# Patient Record
Sex: FEMALE | Race: OTHER | Employment: FULL TIME | URBAN - METROPOLITAN AREA
[De-identification: names, ages, dates, MRNs, and addresses within clinical notes are randomized per-mention and may not be internally consistent; named-entity substitution may affect disease eponyms.]

---

## 2017-02-22 ENCOUNTER — HOSPITAL ENCOUNTER (OUTPATIENT)
Age: 42
Discharge: HOME | End: 2017-02-22
Attending: EMERGENCY MEDICINE
Payer: COMMERCIAL

## 2017-02-22 ENCOUNTER — APPOINTMENT (OUTPATIENT)
Dept: GENERAL RADIOLOGY | Age: 42
End: 2017-02-22
Attending: EMERGENCY MEDICINE
Payer: COMMERCIAL

## 2017-02-22 VITALS
HEIGHT: 63 IN | BODY MASS INDEX: 24.8 KG/M2 | OXYGEN SATURATION: 100 % | DIASTOLIC BLOOD PRESSURE: 73 MMHG | RESPIRATION RATE: 16 BRPM | SYSTOLIC BLOOD PRESSURE: 108 MMHG | WEIGHT: 140 LBS | TEMPERATURE: 99 F | HEART RATE: 72 BPM

## 2017-02-22 DIAGNOSIS — M54.42 ACUTE LEFT-SIDED LOW BACK PAIN WITH LEFT-SIDED SCIATICA: Primary | ICD-10-CM

## 2017-02-22 PROCEDURE — 99214 OFFICE O/P EST MOD 30 MIN: CPT

## 2017-02-22 PROCEDURE — 72110 X-RAY EXAM L-2 SPINE 4/>VWS: CPT

## 2017-02-22 PROCEDURE — 96372 THER/PROPH/DIAG INJ SC/IM: CPT

## 2017-02-22 RX ORDER — METHYLPREDNISOLONE 4 MG/1
TABLET ORAL
Qty: 1 PACKAGE | Refills: 0 | Status: SHIPPED | OUTPATIENT
Start: 2017-02-22

## 2017-02-22 RX ORDER — KETOROLAC TROMETHAMINE 30 MG/ML
60 INJECTION, SOLUTION INTRAMUSCULAR; INTRAVENOUS ONCE
Status: COMPLETED | OUTPATIENT
Start: 2017-02-22 | End: 2017-02-22

## 2017-02-22 RX ORDER — CYCLOBENZAPRINE HCL 5 MG
TABLET ORAL 3 TIMES DAILY PRN
Qty: 30 TABLET | Refills: 0 | Status: SHIPPED | OUTPATIENT
Start: 2017-02-22

## 2017-02-22 RX ORDER — HYDROCODONE BITARTRATE AND ACETAMINOPHEN 5; 325 MG/1; MG/1
1 TABLET ORAL EVERY 6 HOURS PRN
Qty: 12 TABLET | Refills: 0 | Status: SHIPPED | OUTPATIENT
Start: 2017-02-22

## 2017-02-22 NOTE — ED INITIAL ASSESSMENT (HPI)
Patient c/o lower back pain that started over a week ago. States she saw her chiropractor and it is not helping. Patient taking ibuprofen and naproxen without relief.

## 2022-07-04 ENCOUNTER — HOSPITAL ENCOUNTER (OUTPATIENT)
Age: 47
Discharge: HOME | End: 2022-07-04
Payer: COMMERCIAL

## 2022-07-04 ENCOUNTER — APPOINTMENT (OUTPATIENT)
Dept: GENERAL RADIOLOGY | Age: 47
End: 2022-07-04
Attending: NURSE PRACTITIONER
Payer: COMMERCIAL

## 2022-07-04 VITALS
HEART RATE: 70 BPM | WEIGHT: 140 LBS | HEIGHT: 63 IN | SYSTOLIC BLOOD PRESSURE: 124 MMHG | DIASTOLIC BLOOD PRESSURE: 80 MMHG | TEMPERATURE: 99 F | RESPIRATION RATE: 16 BRPM | OXYGEN SATURATION: 97 % | BODY MASS INDEX: 24.8 KG/M2

## 2022-07-04 DIAGNOSIS — J20.8 VIRAL BRONCHITIS: Primary | ICD-10-CM

## 2022-07-04 LAB — SARS-COV-2 RNA RESP QL NAA+PROBE: NOT DETECTED

## 2022-07-04 PROCEDURE — 99203 OFFICE O/P NEW LOW 30 MIN: CPT | Performed by: NURSE PRACTITIONER

## 2022-07-04 PROCEDURE — U0002 COVID-19 LAB TEST NON-CDC: HCPCS | Performed by: NURSE PRACTITIONER

## 2022-07-04 PROCEDURE — 71045 X-RAY EXAM CHEST 1 VIEW: CPT | Performed by: NURSE PRACTITIONER

## 2022-07-04 RX ORDER — ALBUTEROL SULFATE 90 UG/1
2 AEROSOL, METERED RESPIRATORY (INHALATION) EVERY 4 HOURS PRN
Qty: 1 EACH | Refills: 0 | Status: SHIPPED | OUTPATIENT
Start: 2022-07-04 | End: 2022-08-03

## 2022-07-04 RX ORDER — PREDNISONE 20 MG/1
40 TABLET ORAL DAILY
Qty: 10 TABLET | Refills: 0 | Status: SHIPPED | OUTPATIENT
Start: 2022-07-04 | End: 2022-07-09

## 2022-07-04 RX ORDER — BENZONATATE 100 MG/1
100 CAPSULE ORAL 3 TIMES DAILY PRN
Qty: 30 CAPSULE | Refills: 0 | Status: SHIPPED | OUTPATIENT
Start: 2022-07-04 | End: 2022-08-03

## 2022-07-04 NOTE — ED INITIAL ASSESSMENT (HPI)
Pt with c/o nasal congestion and sneezing. Pt with c/o cough that started on Friday.   Pt taking robitussin

## 2024-10-17 NOTE — ED INITIAL ASSESSMENT (HPI)
Pt with co abd pain nvd, recent period for 2 weeks and stopped for 1 week and started again. Was taking motrin and aleve with iron and vit d. Denies blood in diarrhea or vomit.

## 2024-10-17 NOTE — ED PROVIDER NOTES
Patient Seen in: [facility]      History     Chief Complaint   Patient presents with    Abdomen/Flank Pain    Nausea/Vomiting/Diarrhea     Stated Complaint: vomiting; diarrhea; stomach ache    Subjective:   49-year-old female accompanied by her spouse.  Is here for lower abdominal pain, nausea, vomiting, diarrhea.  No prior abdominal surgeries.  No known fevers.  Denies chance of pregnancy.              Objective:     History reviewed. No pertinent past medical history.           History reviewed. No pertinent surgical history.             Social History     Socioeconomic History    Marital status:    Tobacco Use    Smoking status: Never     Social Drivers of Health      Received from [facility]    Social Connections    Received from [facility]    Housing Stability              Review of Systems   Constitutional:  Positive for fatigue.   Gastrointestinal:  Positive for abdominal pain, diarrhea, nausea and vomiting.   All other systems reviewed and are negative.      Positive for stated complaint: vomiting; diarrhea; stomach ache  Other systems are as noted in HPI.  Constitutional and vital signs reviewed.      All other systems reviewed and negative except as noted above.    Physical Exam     ED Triage Vitals [10/16/24 1917]   /84   Pulse 61   Resp 18   Temp 97.8 °F (36.6 °C)   Temp src Temporal   SpO2 98 %   O2 Device None (Room air)       Current Vitals:   Vital Signs  BP: 145/84  Pulse: 61  Resp: 18  Temp: 97.8 °F (36.6 °C)  Temp src: Temporal    Oxygen Therapy  SpO2: 98 %  O2 Device: None (Room air)        Physical Exam  Vitals and nursing note reviewed.   Constitutional:       Appearance: She is well-developed. She is not ill-appearing, toxic-appearing or diaphoretic.   Cardiovascular:      Rate and Rhythm: Normal rate and regular rhythm.      Heart sounds: Normal heart sounds.   Pulmonary:      Effort: Pulmonary effort is normal. No respiratory  distress.   Abdominal:      General: Abdomen is flat.      Palpations: Abdomen is soft.      Tenderness: There is abdominal tenderness in the right lower quadrant and suprapubic area. There is no right CVA tenderness, left CVA tenderness or guarding.   Skin:     General: Skin is warm and dry.      Coloration: Skin is not jaundiced or pale.      Findings: No rash.   Neurological:      General: No focal deficit present.      Mental Status: She is alert.   Psychiatric:         Mood and Affect: Mood normal.             ED Course   Labs Reviewed - No data to display                MDM              Medical Decision Making  49-year-old female with right lower quadrant tenderness, nausea, vomiting, diarrhea.  Differential include but not limited to appendicitis, bowel obstruction, gastroenteritis.  Likely will need advanced imaging which is not available at this time.  Instructed to go to the ER.  Her and her spouse voiced understanding agree with the plan of care.  States that she will have her spouse take her to [facility] emergency department.  She understands not to eat or drink anything until further instructions from the ER staff.  Ambulance ride was offered but was politely declined.  She left in stable condition.        Speech recognition software was used during this dictation.  There may be minor errors in transcription.          Disposition and Plan     Clinical Impression:  1. Lower abdominal pain    2. Nausea and vomiting in adult         Disposition:  Ic to ed  10/16/2024  7:37 pm    Follow-up:  No follow-up provider specified.        Medications Prescribed:  There are no discharge medications for this patient.          Supplementary Documentation:

## (undated) NOTE — LETTER
[facility] IN [place]  [address] [id number]  Dept: [phone]  Dept Fax: [phone]         February 22, 2017    Patient: Ashlee Leon   YOB: 1975   Date of Visit: 2/22/2017       To Whom It May Concern:     Sa

## (undated) NOTE — ED AVS SNAPSHOT
[facility] in [address] [id number]    Phone:  [phone]    Fax:  [phone]           Lola Queen   MRN: [id number]    Department:  [facility] [place]   Date of Visit:  2/22/2017           Diagno Hours:  24-hours Phone:  [phone]    - Cyclobenzaprine HCl 5 MG Tabs  - methylPREDNISolone 4 MG Tbpk      You can get these medications from any pharmacy     Bring a paper prescription for each of these medications    - HYDROcodone-acetaminophen 5-325 to your personal doctor) about any new or lasting problems. The primary care or specialist physician will see patients referred from the [facility]. Follow-up care is at the discretion of that Physician.     IF THERE IS ANY CHANGE OR WORSENING O - If you have concerns related to behavioral health issues or thoughts of harming yourself, contact [address] [place] [address] at [phone].     - If you don’t have insurance, [facility] has partnered with [email or web address] Enter your [facility] Activation Code exactly as it appears below along with your Zip Code and Date of Birth to complete the sign-up process. If you do not sign up before the expiration date, you must request a new code.     Your unique [employer] [facility] Access Code: [email or web address]